# Patient Record
(demographics unavailable — no encounter records)

---

## 2024-12-03 NOTE — HISTORY OF PRESENT ILLNESS
[de-identified] : 12/3/24: - Has been experiencing left knee pain since May 2024. - Received steroid injections with Dr. Sebastian, which provided minimal relief. - Pain is mostly on the inside (medial) of the knee. - Significant limitation in activities, such as dancing and navigating stairs. - Currently taking naltrexone for knee inflammation. Dances multiple times a week and is unable to do this because of the left knee pain.  Does feel this is limiting her life.  PMH: Psoriatic arthritis on Enbrel and naltrexone, hypertension Relevant allergies: Penicillin-hyperventilation AC: Denies Hx of DVT/PE: Denies Tobacco: Denies

## 2024-12-03 NOTE — PHYSICAL EXAM
[de-identified] : Left lower extremity Hip: Normal ROM without pain on IR/ER Knee Inspection: Mild effusion Wounds: None Alignment: Mild valgus Palpation: tender to palpation at joint line ROM active (in degrees): 0-120 with crepitus/pain through the arc of motion Ligamentous laxity: all ligaments appear stable, stable to varus stress test, stable to valgus stress test Meniscal Test: Positive McMurrays, negative Edna. Muscle Test: good quad strength. [de-identified] : Prior imaging brought in by patient reviewed-tricompartmental osteoarthritis with lateral joint space narrowing, bone-on-bone, and osteophyte formation

## 2024-12-03 NOTE — DISCUSSION/SUMMARY
[de-identified] : Left knee arthritis with limitations of activities of daily living and conservative treatment options failing to improve disability.  Plan: Left total Knee Arthroplasty  The patient has arthritis/ end stage joint disease of the knee supported by x-ray or MRI that demonstrated one of the following:  periarticular osteophytes; joint space narrowing; subchondral cysts; subchondral sclerosis;  bone on bone articulation;   One or more of the following conservative treatments have been tried and failed for 3 months or more: analgesic medication; home exercises; brace; cortisone shots; anti-inflammatory medication;     We discussed the natural history of knee arthritis and the potential treatment options. The importance of diet and exercise was discussed as excess weight is a significant contributing factor. Due to the pain, failure of prior nonoperative treatment, the patient is indicated for a total knee replacement.   A risk/benefit analysis was discussed with the patient reviewing the advantages and disadvantages of surgical intervention at this time. Both the level and length of the patient's pain have made additional conservative treatment measures contraindicated. A full explanation was given of the nature and the purpose of the procedure and anesthesia, its benefits, possible alternative methods of treatment, the risks involved, the possibility of complications, the foreseeable consequences of the procedure and the possible results of the non-treatment. I reviewed the plan of care as well as a model of a total knee implant equivalent to the one that will be used for their replacement. The patient agrees with the plan of care as well as the use of implants for their total knee replacement.   The potential biologic and mechanical risks of the procedure were discussed. This discussion included but was not limited to thromboembolic disease, fracture, loss of fixation, infection, leg length discrepancy, dislocation and neurovascular injury. The patient is also understands that anesthesia and their comorbidities present additional risks. Proper expectations were addressed as this surgery may only partially or even fail to relieve their pain. The patient understands that additional surgery may be needed during the perioperative period or in the future. We discussed the durability of prosthetic knees and limitations related to wear, osteolysis and loosening. All questions were answered to the patient's satisfaction. The patient was given my packet of additional information about the procedure.  Expect 1-2 day stay in hospital as this is less than standard across the country.  Although outpatient surgery may be feasible in carefully selected heathy patients - the definition of a "healthy" patient to do this in has not been properly studied in randomized trials.  This hospital time is important to observe for urinary retention, neurologic decline, cardiopulmonary issues, and signs of blood clot which are frequent early complications of joint replacements.  This time will also be used to work with PT so they are safe to navigate without falling which could lead to fracture and more surgery.   There is no evidence for any of the following contraindications for total joint replacement surgery:  active infection; active systemic bacteremia; active skin infection or open wound at surgical site; neuropathic arthritis; severe, rapidly progressive neurologic disease; severe medical conditions that makes the risks of surgery outweigh the potential benefit.  The knee pain is affecting the ability to perform activities of daily living despite activity modifications.  The painful knee exam and the radiographic findings of cartilage loss are consistent with the knee OA as the source of the disability and pain.  We did discuss that the patient psoriatic arthritis does increase the risk of infection above the baseline 1% to approximately 2%.  I also discussed there is no guarantee that her knee will not bother her when she is dancing as this is a strenuous activity.  The goal of the surgery is for pain relief during daily activities.  I am recommending the ROM tech Portable Connect device as it is medically necessary for the patient's surgical recovery to provide an optimal outcome.  Surgery will be scheduled at a convenient time.   DME: [employer] [facility] Preop medical, rheumatology clearance.  Postop DVT ppx: Per Caprini Score Abx ppx: Ancef with extended Duricef secondary to psoriatic arthritis Dressing: Negative pressure Polyethylene: PS/CPS

## 2025-01-09 NOTE — DISCUSSION/SUMMARY
[FreeTextEntry1] : 1) pap deferred, hx of normal paps, same partner. pt noted to have poor pelvic floor tone. referred for pelvic floor therapy also due to c/o urinary leakage 2) rx for screening mammo/sono issued 3) consistent weight bearing/weight lifting exercise advised  Return to office in one year, sooner prn

## 2025-01-09 NOTE — PHYSICAL EXAM
[Appropriately responsive] : appropriately responsive [Alert] : alert [No Acute Distress] : no acute distress [No Lymphadenopathy] : no lymphadenopathy [Oriented x3] : oriented x3 [Examination Of The Breasts] : a normal appearance [No Discharge] : no discharge [No Masses] : no breast masses were palpable [Labia Majora] : normal [Labia Minora] : normal [Normal] : normal [Uterine Adnexae] : normal [FreeTextEntry4] : poor pelvic tone

## 2025-01-09 NOTE — HISTORY OF PRESENT ILLNESS
[Patient reported PAP Smear was normal] : Patient reported PAP Smear was normal [Patient reported bone density results were abnormal] : Patient reported bone density results were abnormal [Currently In Menopause] : currently in menopause [No] : Patient does not have concerns regarding sex [Currently Active] : currently active [TextBox_4] : Lis is a 67 y/o  who presens today for an annual exam with request for vaginal estrogen refill. she has not had an abnormal pap within past 10 years nor has she been with a new partner the past 10 years.    She has a history of a bladder sling, now reporting urinary leakage. She is noted to have poor pelvic floor tone upon exam. Pelvic floor therapy advised and referral placed.  She has a history of dense breast tissue.  She is on Prolia via her rheumatologist. She dances and goes to the gym.    She is an occupational therapist manager. [Mammogramdate] : 2/15/24 [PapSmeardate] : 12/21/23 [BoneDensityDate] : 2/8/24 [TextBox_37] : severe osteopenia [ColonoscopyDate] : 2/15/24

## 2025-03-18 NOTE — HISTORY OF PRESENT ILLNESS
[TextEntry] : Patient here for follow-up status post left total knee replacement on 2/26/2025.  Is taking Tylenol & Celecoxib for pain.  Does have some constipation close been using magnesium, senna and Dulcolax.  States she is going but just not normal at this point.  Left knee Incision healing well without signs of infection ROM 0-120 Stable to varus/valgus stress Minimal AP laxity in flexion Neurovascularly intact  Assessment/plan Patient doing well.  Continue physical therapy/encourage ambulation.  Recommend continue with stool softeners, extra fluids, and increase fiber.  Encouraged working on ROM.  Pain control as needed.  Patient to follow-up in 3 weeks.

## 2025-05-06 NOTE — HISTORY OF PRESENT ILLNESS
[TextEntry] : Patient here for follow-up status post left total knee replacement on 2/26/2025.  Noted slight drainage yesterday from the distal aspect the wound where it eschar previously was.  Also notes her left great toe has likely fungal infection.  Left knee Small area of granulation tissue distal aspect of incision. ROM 0-120 Minimal appreciable effusion Stable to varus/valgus stress Minimal AP laxity in flexion Neurovascularly intact  Assessment/plan Prescription for 10 days of Duricef given to patient. Telfa Tegaderms given to patient. Patient to follow-up in 1-1/2 weeks.  Discussed if any signs of worse infection Durolane immediately.  Discussed she should follow-up with podiatrist about taking antifungals for the toe.  ESR CRP ordered to trend if needed.

## 2025-05-16 NOTE — HISTORY OF PRESENT ILLNESS
[TextEntry] : Patient here for follow-up status post left total knee replacement on 2/26/2025.  Tenting drainage on bandage.  Also notes her left great toe has likely fungal infection.  Left knee Small area of granulation tissue distal aspect of incision.  No active drainage or surrounding erythema ROM 0-125 Minimal appreciable effusion Stable to varus/valgus stress Minimal AP laxity in flexion Neurovascularly intact  ESR and CRP within normal limits  Follow-up in 2 to 3 weeks.  If any signs of worsening wound issues patient should return sooner.  Keep wound covered.

## 2025-06-03 NOTE — HISTORY OF PRESENT ILLNESS
[de-identified] : 6/3/25: Patient here for follow-up status post left total knee replacement on 2/26/2025. Tenting drainage on bandage. Also notes her left great toe has likely fungal infection. *** 12/3/24: - Has been experiencing left knee pain since May 2024. - Received steroid injections with Dr. Sebastian, which provided minimal relief. - Pain is mostly on the inside (medial) of the knee. - Significant limitation in activities, such as dancing and navigating stairs. - Currently taking naltrexone for knee inflammation. Dances multiple times a week and is unable to do this because of the left knee pain.  Does feel this is limiting her life.  PMH: Psoriatic arthritis on Enbrel and naltrexone, hypertension Relevant allergies: Penicillin-hyperventilation AC: Denies Hx of DVT/PE: Denies Tobacco: Denies

## 2025-06-03 NOTE — DISCUSSION/SUMMARY
[de-identified] : Patient doing well.  Begin Yuriy to help with healing of the distal wound.  Prescription sent.  Prescription for Santyl ointment also sent.  Follow-up in 4 weeks. If any signs of worsening wound issues patient should return sooner.   ESR and CRP previously within normal limits

## 2025-06-03 NOTE — PHYSICAL EXAM
[de-identified] : Left knee Small area of granulation tissue distal aspect of incision. No active drainage or surrounding erythema ROM 0-125 Stable to varus/valgus stress Minimal AP laxity in flexion Neurovascularly intact [de-identified] : 6/3/25: Left knee 3 views-hardware in good alignment *** Prior imaging brought in by patient reviewed-tricompartmental osteoarthritis with lateral joint space narrowing, bone-on-bone, and osteophyte formation

## 2025-07-25 NOTE — PHYSICAL EXAM
[de-identified] : Left knee Small area of granulation tissue distal aspect of incision. No active drainage or surrounding erythema ROM 0-125 Stable to varus/valgus stress Minimal AP laxity in flexion Neurovascularly intact [de-identified] : 7/31/25:  6/3/25: Left knee 3 views-hardware in good alignment *** Prior imaging brought in by patient reviewed-tricompartmental osteoarthritis with lateral joint space narrowing, bone-on-bone, and osteophyte formation

## 2025-07-25 NOTE — HISTORY OF PRESENT ILLNESS
[de-identified] : 7/31/25: Patient here for follow-up status post left total knee replacement on 2/26/2025.    6/30/25:: Patient here for follow-up status post left total knee replacement on 2/26/2025.  She had her knee while trying to get her cat into the carrier on Wednesday.  Since then the knee has been sore but it has been improving.  She is using Medihoney on the small wound, no active drainage.  6/3/25: Patient here for follow-up status post left total knee replacement on 2/26/2025.  Minimal drainage on bandage. Also notes her left great toe has likely fungal infection. *** 12/3/24: - Has been experiencing left knee pain since May 2024. - Received steroid injections with Dr. Sebastian, which provided minimal relief. - Pain is mostly on the inside (medial) of the knee. - Significant limitation in activities, such as dancing and navigating stairs. - Currently taking naltrexone for knee inflammation. Dances multiple times a week and is unable to do this because of the left knee pain.  Does feel this is limiting her life.  PMH: Psoriatic arthritis on Enbrel and naltrexone, hypertension Relevant allergies: Penicillin-hyperventilation AC: Denies Hx of DVT/PE: Denies Tobacco: Denies

## 2025-07-25 NOTE — HISTORY OF PRESENT ILLNESS
[de-identified] : 7/31/25: Patient here for follow-up status post left total knee replacement on 2/26/2025.    6/30/25:: Patient here for follow-up status post left total knee replacement on 2/26/2025.  She had her knee while trying to get her cat into the carrier on Wednesday.  Since then the knee has been sore but it has been improving.  She is using Medihoney on the small wound, no active drainage.  6/3/25: Patient here for follow-up status post left total knee replacement on 2/26/2025.  Minimal drainage on bandage. Also notes her left great toe has likely fungal infection. *** 12/3/24: - Has been experiencing left knee pain since May 2024. - Received steroid injections with Dr. Sebastian, which provided minimal relief. - Pain is mostly on the inside (medial) of the knee. - Significant limitation in activities, such as dancing and navigating stairs. - Currently taking naltrexone for knee inflammation. Dances multiple times a week and is unable to do this because of the left knee pain.  Does feel this is limiting her life.  PMH: Psoriatic arthritis on Enbrel and naltrexone, hypertension Relevant allergies: Penicillin-hyperventilation AC: Denies Hx of DVT/PE: Denies Tobacco: Denies

## 2025-07-25 NOTE — PHYSICAL EXAM
[de-identified] : Left knee Small area of granulation tissue distal aspect of incision. No active drainage or surrounding erythema ROM 0-125 Stable to varus/valgus stress Minimal AP laxity in flexion Neurovascularly intact [de-identified] : 7/31/25:  6/3/25: Left knee 3 views-hardware in good alignment *** Prior imaging brought in by patient reviewed-tricompartmental osteoarthritis with lateral joint space narrowing, bone-on-bone, and osteophyte formation

## 2025-07-25 NOTE — DISCUSSION/SUMMARY
[de-identified] : Patient doing well.  Continue with Yuriy and Medihoney..  Follow-up in 4 weeks. If any signs of worsening wound issues patient should return sooner.  We did discuss if this wound does not heal or starts to worsen we can eventually consider doing an office debridement.  ESR and CRP previously within normal limits

## 2025-07-25 NOTE — DISCUSSION/SUMMARY
[de-identified] : Patient doing well.  Continue with Yuriy and Medihoney..  Follow-up in 4 weeks. If any signs of worsening wound issues patient should return sooner.  We did discuss if this wound does not heal or starts to worsen we can eventually consider doing an office debridement.  ESR and CRP previously within normal limits